# Patient Record
(demographics unavailable — no encounter records)

---

## 2024-11-02 NOTE — PLAN
[FreeTextEntry1] : irregular bleeding - check tsh and cbc - ok to start nuvaring correct use, risks, benefits reviewed - provided tsh and cbc normal will monitor bleeding with the ring and plan to f/u in 6 months. - f/u sooner if irregular bleeding does not resolve with ring use.

## 2024-11-02 NOTE — HISTORY OF PRESENT ILLNESS
[Home] : at home, [unfilled] , at the time of the visit. [Medical Office: ([facility])___] : at the medical office located in  [Verbal consent obtained from patient] : the patient, [unfilled] [TextBox_4] : Christina is meeting today via telehealth to discuss bc options. she is interested in BC because she wants/needs contraception, and she wants to regulate her cycle.  She feels her period is coming more frequently lately.  10/24 10/7-14th 9/20-26 8/29-9/3 8/8-14th 7/7-14th  she typically does not have PMS symptoms and that has not changed with the frequent periods. She denies bleeding with sex.

## 2024-11-02 NOTE — HISTORY OF PRESENT ILLNESS
[Home] : at home, [unfilled] , at the time of the visit. [Medical Office: ([facility])___] : at the medical office located in  [Verbal consent obtained from patient] : the patient, [unfilled] [TextBox_4] : Christina is meeting today via telehealth to discuss bc options. she is interested in BC because she wants/needs contraception, and she wants to regulate her cycle.  She feels her period is coming more frequently lately.  10/24 10/7-14th 9/20-26 8/29-9/3 8/8-14th 7/7-14th  she typically does not have PMS symptoms and that has not changed with the frequent periods. She denies bleeding with sex.